# Patient Record
Sex: MALE | Race: WHITE | Employment: UNEMPLOYED | URBAN - METROPOLITAN AREA
[De-identification: names, ages, dates, MRNs, and addresses within clinical notes are randomized per-mention and may not be internally consistent; named-entity substitution may affect disease eponyms.]

---

## 2021-04-17 ENCOUNTER — HOSPITAL ENCOUNTER (EMERGENCY)
Age: 36
Discharge: HOME | End: 2021-04-17
Attending: EMERGENCY MEDICINE

## 2021-04-17 VITALS
HEIGHT: 67 IN | TEMPERATURE: 98.5 F | BODY MASS INDEX: 25.9 KG/M2 | WEIGHT: 165 LBS | RESPIRATION RATE: 14 BRPM | OXYGEN SATURATION: 97 % | SYSTOLIC BLOOD PRESSURE: 111 MMHG | HEART RATE: 86 BPM | DIASTOLIC BLOOD PRESSURE: 87 MMHG

## 2021-04-17 VITALS
HEIGHT: 67 IN | OXYGEN SATURATION: 98 % | DIASTOLIC BLOOD PRESSURE: 88 MMHG | SYSTOLIC BLOOD PRESSURE: 128 MMHG | WEIGHT: 165 LBS | RESPIRATION RATE: 14 BRPM | BODY MASS INDEX: 25.9 KG/M2 | TEMPERATURE: 98.4 F | HEART RATE: 87 BPM

## 2021-04-17 DIAGNOSIS — R73.9 HYPERGLYCEMIA: Primary | ICD-10-CM

## 2021-04-17 DIAGNOSIS — T14.8XXA FOREIGN BODY IN SKIN: ICD-10-CM

## 2021-04-17 LAB
ANION GAP SERPL CALCULATED.3IONS-SCNC: 8 MMOL/L (ref 7–16)
BASOPHILS ABSOLUTE: 0.02 E9/L (ref 0–0.2)
BASOPHILS RELATIVE PERCENT: 0.3 % (ref 0–2)
BUN BLDV-MCNC: 15 MG/DL (ref 6–20)
CALCIUM SERPL-MCNC: 8.9 MG/DL (ref 8.6–10.2)
CHLORIDE BLD-SCNC: 96 MMOL/L (ref 98–107)
CHP ED QC CHECK: YES
CO2: 27 MMOL/L (ref 22–29)
CREAT SERPL-MCNC: 0.8 MG/DL (ref 0.7–1.2)
EOSINOPHILS ABSOLUTE: 0.08 E9/L (ref 0.05–0.5)
EOSINOPHILS RELATIVE PERCENT: 1.1 % (ref 0–6)
GFR AFRICAN AMERICAN: >60
GFR NON-AFRICAN AMERICAN: >60 ML/MIN/1.73
GLUCOSE BLD-MCNC: 289 MG/DL
GLUCOSE BLD-MCNC: 509 MG/DL (ref 74–99)
HCT VFR BLD CALC: 38.4 % (ref 37–54)
HEMOGLOBIN: 12.6 G/DL (ref 12.5–16.5)
IMMATURE GRANULOCYTES #: 0.01 E9/L
IMMATURE GRANULOCYTES %: 0.1 % (ref 0–5)
LYMPHOCYTES ABSOLUTE: 1.74 E9/L (ref 1.5–4)
LYMPHOCYTES RELATIVE PERCENT: 24.1 % (ref 20–42)
MCH RBC QN AUTO: 30.5 PG (ref 26–35)
MCHC RBC AUTO-ENTMCNC: 32.8 % (ref 32–34.5)
MCV RBC AUTO: 93 FL (ref 80–99.9)
METER GLUCOSE: 289 MG/DL (ref 74–99)
METER GLUCOSE: 453 MG/DL (ref 74–99)
METER GLUCOSE: >500 MG/DL (ref 74–99)
MONOCYTES ABSOLUTE: 0.54 E9/L (ref 0.1–0.95)
MONOCYTES RELATIVE PERCENT: 7.5 % (ref 2–12)
NEUTROPHILS ABSOLUTE: 4.84 E9/L (ref 1.8–7.3)
NEUTROPHILS RELATIVE PERCENT: 66.9 % (ref 43–80)
PDW BLD-RTO: 12.3 FL (ref 11.5–15)
PLATELET # BLD: 200 E9/L (ref 130–450)
PMV BLD AUTO: 9.9 FL (ref 7–12)
POTASSIUM SERPL-SCNC: 4.6 MMOL/L (ref 3.5–5)
RBC # BLD: 4.13 E12/L (ref 3.8–5.8)
SODIUM BLD-SCNC: 131 MMOL/L (ref 132–146)
WBC # BLD: 7.2 E9/L (ref 4.5–11.5)

## 2021-04-17 PROCEDURE — 82962 GLUCOSE BLOOD TEST: CPT

## 2021-04-17 PROCEDURE — 6370000000 HC RX 637 (ALT 250 FOR IP): Performed by: STUDENT IN AN ORGANIZED HEALTH CARE EDUCATION/TRAINING PROGRAM

## 2021-04-17 PROCEDURE — 85025 COMPLETE CBC W/AUTO DIFF WBC: CPT

## 2021-04-17 PROCEDURE — 99284 EMERGENCY DEPT VISIT MOD MDM: CPT

## 2021-04-17 PROCEDURE — 99283 EMERGENCY DEPT VISIT LOW MDM: CPT

## 2021-04-17 PROCEDURE — 80048 BASIC METABOLIC PNL TOTAL CA: CPT

## 2021-04-17 RX ORDER — 0.9 % SODIUM CHLORIDE 0.9 %
1000 INTRAVENOUS SOLUTION INTRAVENOUS ONCE
Status: DISCONTINUED | OUTPATIENT
Start: 2021-04-17 | End: 2021-04-17 | Stop reason: HOSPADM

## 2021-04-17 RX ORDER — ROCURONIUM BROMIDE 10 MG/ML
INJECTION, SOLUTION INTRAVENOUS
Status: DISCONTINUED
Start: 2021-04-17 | End: 2021-04-17

## 2021-04-17 RX ORDER — ETOMIDATE 2 MG/ML
INJECTION INTRAVENOUS
Status: DISCONTINUED
Start: 2021-04-17 | End: 2021-04-17

## 2021-04-17 RX ORDER — SODIUM CHLORIDE 0.9 % (FLUSH) 0.9 %
10 SYRINGE (ML) INJECTION PRN
Status: DISCONTINUED | OUTPATIENT
Start: 2021-04-17 | End: 2021-04-17 | Stop reason: HOSPADM

## 2021-04-17 RX ADMIN — INSULIN LISPRO 5 UNITS: 100 INJECTION, SOLUTION INTRAVENOUS; SUBCUTANEOUS at 11:23

## 2021-04-17 NOTE — ED NOTES
Pt taken out by EARL MORILLO [facility] to squad car to go to [facility].       Juan Arango RN  04/17/21 0830

## 2021-04-17 NOTE — ED NOTES
Bed: 22  Expected date:   Expected time:   Means of arrival:   Comments:  champ Spivey RN  04/17/21 4787

## 2021-04-17 NOTE — ED NOTES
Reviewed discharge instructions with patient, patient verbalized understanding of follow up care and denies any further questions, no acute signs/symptoms of distress upon discharge, pt ambulatory upon d/c in police custody       Rachelle Xie RN  04/17/21 7188

## 2021-04-17 NOTE — ED PROVIDER NOTES
past surgical history, social history, and family history    ---------------------------------------------------PHYSICAL EXAM--------------------------------------    Constitutional/General: Alert and oriented x3. Anxious and agitated but in no acute distress. Head: Normocephalic and atraumatic  Eyes:  EOMI, sclera non icteric  Mouth: Oropharynx clear, handling secretions, no trismus, no asymmetry of the posterior oropharynx or uvular edema  Neck: Supple, full ROM, no stridor, no meningeal signs  Respiratory: Lungs clear to auscultation bilaterally, no wheezes, rales, or rhonchi. Not in respiratory distress  Cardiovascular:  Regular rate. Regular rhythm. No murmurs, no aortic murmurs, no gallops, or rubs. Chest: No chest wall tenderness  Gastrointestinal:  Abdomen Soft, Non tender, Non distended. No rebound, guarding, or rigidity. No pulsatile masses. Musculoskeletal: Moves all extremities x 4. Warm and well perfused, no clubbing, no cyanosis, no edema. Capillary refill <3 seconds  Skin: skin warm and dry. No rashes. Neurologic: GCS 15, no focal deficits, symmetric strength 5/5 in the upper and lower extremities bilaterally  Psychiatric: Normal Affect    -------------------------------------------------- RESULTS -------------------------------------------------  I have personally reviewed all laboratory and imaging results for this patient. Results are listed below. LABS: (Lab results interpreted by me)  No results found for this visit on 04/17/21.,       RADIOLOGY:  Interpreted by Radiologist unless otherwise specified  No orders to display         ------------------------- NURSING NOTES AND VITALS REVIEWED ---------------------------   The nursing notes within the ED encounter and vital signs as below have been reviewed by myself  Pulse 86   Resp 14   Ht 5' 7\" (1.702 m)   Wt 165 lb (74.8 kg)   SpO2 97%   BMI 25.84 kg/m²     Oxygen Saturation Interpretation: 97 % on room air.   Normal    The patients available past medical records and past encounters were reviewed. ------------------------------ ED COURSE/MEDICAL DECISION MAKING----------------------  Medications - No data to display          Medical Decision Making:     The patient was seen and evaluated by the Attending Emergency Medicine Physician Dr. Priyanka Dubon. The patient is a 69-year-old male presents to the emergency department for hyperglycemia. The patient's blood sugar was evaluated in the ED and was found to be 289. Patient was not having any other symptoms at this time. Recommend him to continue taking his diabetic medications at home. He is to follow-up with his family doctor. He was discharged back to [facility] with police. This patient's ED course included: a personal history and physicial examination and re-evaluation prior to disposition    This patient has remained hemodynamically stable during their ED course. Counseling: The emergency provider has spoken with the patient and discussed todays results, in addition to providing specific details for the plan of care and counseling regarding the diagnosis and prognosis. Questions are answered at this time and they are agreeable with the plan.       --------------------------------- IMPRESSION AND DISPOSITION ---------------------------------    IMPRESSION  1. Hyperglycemia        DISPOSITION  Disposition: Discharge to [facility]  Patient condition is stable        NOTE: This report was transcribed using voice recognition software.  Every effort was made to ensure accuracy; however, inadvertent computerized transcription errors may be present        Rosas Navarro DO  Resident  04/17/21 530

## 2021-04-17 NOTE — ED PROVIDER NOTES
Department of Emergency Medicine   ED  Provider Note  Admit Date/RoomTime: 4/17/2021 10:10 AM  ED Room: XU/XU          History of Present Illness:  4/17/21, Time: 10:45 AM EDT  Chief Complaint   Patient presents with    Hyperglycemia     pt was seen earlier for high blood sugar. went to [facility] for booking and was refused. no symptoms at this time. Rachel Valdes is a 38 y.o. male presenting to the ED for hyperglycemia, beginning today. The complaint has been persistent, moderate in severity, and worsened by nothing. Patient is a 38-year-old male type II diabetic who presents to the emergency department escorted by police for hyperglycemia. The patient was actually just seen here earlier today for high blood sugar and had a blood sugar of 289. Apparently he ate and then went to the [place] longterm for booking and they state that his blood sugar was too high for him to be booked into longterm at this time. The patient does not have any symptoms at this time. He has been out of his medications for a while. He denies any chest pain, shortness breath, lightheadedness, dizziness, syncope, nausea, vomiting, diarrhea, abdominal pain, or other acute symptoms or concerns. Review of Systems:   Pertinent positives and negatives are stated within HPI, all other systems reviewed and are negative.        --------------------------------------------- PAST HISTORY ---------------------------------------------  Past Medical History:  has no past medical history on file. Past Surgical History:  has no past surgical history on file. Social History:  reports that he has quit smoking. He has never used smokeless tobacco. He reports current drug use. Drug: Opiates . Family History: family history is not on file. . Unless otherwise noted, family history is non contributory    The patients home medications have been reviewed. Allergies:  Other and Shellfish-derived products    I have reviewed the past medical 24.1 20.0 - 42.0 %    Monocytes % 7.5 2.0 - 12.0 %    Eosinophils % 1.1 0.0 - 6.0 %    Basophils % 0.3 0.0 - 2.0 %    Neutrophils Absolute 4.84 1.80 - 7.30 E9/L    Immature Granulocytes # 0.01 E9/L    Lymphocytes Absolute 1.74 1.50 - 4.00 E9/L    Monocytes Absolute 0.54 0.10 - 0.95 E9/L    Eosinophils Absolute 0.08 0.05 - 0.50 E9/L    Basophils Absolute 0.02 0.00 - 0.20 H1/C   Basic Metabolic Panel   Result Value Ref Range    Sodium 131 (L) 132 - 146 mmol/L    Potassium 4.6 3.5 - 5.0 mmol/L    Chloride 96 (L) 98 - 107 mmol/L    CO2 27 22 - 29 mmol/L    Anion Gap 8 7 - 16 mmol/L    Glucose 509 (HH) 74 - 99 mg/dL    BUN 15 6 - 20 mg/dL    CREATININE 0.8 0.7 - 1.2 mg/dL    GFR Non-African American >60 >=60 mL/min/1.73    GFR African American >60     Calcium 8.9 8.6 - 10.2 mg/dL   POCT Glucose   Result Value Ref Range    Meter Glucose >500 (H) 74 - 99 mg/dL   POCT Glucose   Result Value Ref Range    Meter Glucose 453 (H) 74 - 99 mg/dL   ,       RADIOLOGY:  Interpreted by Radiologist unless otherwise specified  No orders to display       ------------------------- NURSING NOTES AND VITALS REVIEWED ---------------------------   The nursing notes within the ED encounter and vital signs as below have been reviewed by myself  /88   Pulse 87   Temp 98.4 °F (36.9 °C) (Temporal)   Resp 14   Ht 5' 7\" (1.702 m)   Wt 165 lb (74.8 kg)   SpO2 98%   BMI 25.84 kg/m²     Oxygen Saturation Interpretation: 98 % on room air. Normal    The patients available past medical records and past encounters were reviewed. ------------------------------ ED COURSE/MEDICAL DECISION MAKING----------------------  Medications   insulin lispro (HUMALOG) injection vial 5 Units (5 Units Subcutaneous Given 4/17/21 1123)           The cardiac monitor revealed NSR with a heart rate in the 80s as interpreted by me. The cardiac monitor was ordered secondary to the patient's hyperglycemia and to monitor the patient for dysrhythmia.    CPT [facility] [id number] Making:     The patient was seen and evaluated by the Attending Emergency Medicine Physician Dr. Wang Sepulveda. The patient is a 70-year-old male presents to the emergency department complaning of hyperglycemia. The patient is hemodynamically stable, nontoxic in appearance, no acute distress. He does not have any acute complaints at this time. Patient was just seen here and blood sugar was 289. He did eat after he left and his blood sugar was up in the 400s, his point-of-care blood glucose here was over 500. Patient was refusing an IV or labs to be drawn. However, he did agree to a straight draw for a BMP. He states that he would drink water and did not want an IV or fluids. The patient became agitated and tried to run and leave the emergency department. There were 2 police officers in his room at times. The patient was tased. His repeat blood sugar was less than 500. The patient was discharged back to nursing home. PROCEDURE    FOREIGN BODY REMOVAL  Risks, benefits and alternatives (for applicable procedures below) described. Performed by:   Under the direct supervision of (if necessary): Indication:  retained foreign body. Location:   Chest wall . Informed consent obtained: by patient or legal gardian. Prep: The skin was none  required and draped in a sterile fashion. Anesthetic: Not required   Foreign Body was: removed using tweezers and had the appearance of metal.  Ultrasound Guidance:   not used. Complications:  None. Patient tolerated the procedure well. Re-Evaluations:       Patient is feeling better.       This patient's ED course included: a personal history and physicial examination, re-evaluation prior to disposition, multiple bedside re-evaluations, IV medications, cardiac monitoring, continuous pulse oximetry and complex medical decision making and emergency management    This patient has remained hemodynamically stable during their ED course. Counseling: The emergency provider has spoken with the patient and discussed todays results, in addition to providing specific details for the plan of care and counseling regarding the diagnosis and prognosis. Questions are answered at this time and they are agreeable with the plan.       --------------------------------- IMPRESSION AND DISPOSITION ---------------------------------    IMPRESSION  1. Hyperglycemia    2. Foreign body in skin        DISPOSITION  Disposition: Discharge to custodial  Patient condition is stable        NOTE: This report was transcribed using voice recognition software.  Every effort was made to ensure accuracy; however, inadvertent computerized transcription errors may be present        Alberta Nuñez DO  Resident  04/17/21 3656

## 2023-01-19 ENCOUNTER — HOSPITAL ENCOUNTER (EMERGENCY)
Age: 38
Discharge: PSYCHIATRIC OR SUBSTANCE USE FACILITY | End: 2023-01-21
Attending: EMERGENCY MEDICINE
Payer: COMMERCIAL

## 2023-01-19 DIAGNOSIS — S00.531A CONTUSION OF LIP, INITIAL ENCOUNTER: ICD-10-CM

## 2023-01-19 DIAGNOSIS — T14.91XA SUICIDE ATTEMPT (HCC): ICD-10-CM

## 2023-01-19 DIAGNOSIS — F39 MOOD DISORDER (HCC): Primary | ICD-10-CM

## 2023-01-19 LAB
ACETAMINOPHEN LEVEL: <5 MCG/ML (ref 10–30)
ALBUMIN SERPL-MCNC: 4.6 G/DL (ref 3.5–5.2)
ALP BLD-CCNC: 129 U/L (ref 40–129)
ALT SERPL-CCNC: 40 U/L (ref 0–40)
AMPHETAMINE SCREEN, URINE: NOT DETECTED
ANION GAP SERPL CALCULATED.3IONS-SCNC: 15 MMOL/L (ref 7–16)
AST SERPL-CCNC: 24 U/L (ref 0–39)
BARBITURATE SCREEN URINE: NOT DETECTED
BASOPHILS ABSOLUTE: 0.02 E9/L (ref 0–0.2)
BASOPHILS RELATIVE PERCENT: 0.2 % (ref 0–2)
BENZODIAZEPINE SCREEN, URINE: NOT DETECTED
BILIRUB SERPL-MCNC: <0.2 MG/DL (ref 0–1.2)
BUN BLDV-MCNC: 17 MG/DL (ref 6–20)
CALCIUM SERPL-MCNC: 10.2 MG/DL (ref 8.6–10.2)
CANNABINOID SCREEN URINE: NOT DETECTED
CHLORIDE BLD-SCNC: 100 MMOL/L (ref 98–107)
CHP ED QC CHECK: NORMAL
CHP ED QC CHECK: NORMAL
CO2: 22 MMOL/L (ref 22–29)
COCAINE METABOLITE SCREEN URINE: NOT DETECTED
CREAT SERPL-MCNC: 0.8 MG/DL (ref 0.7–1.2)
EKG ATRIAL RATE: 96 BPM
EKG P AXIS: 41 DEGREES
EKG P-R INTERVAL: 158 MS
EKG Q-T INTERVAL: 338 MS
EKG QRS DURATION: 92 MS
EKG QTC CALCULATION (BAZETT): 427 MS
EKG R AXIS: 39 DEGREES
EKG T AXIS: 41 DEGREES
EKG VENTRICULAR RATE: 96 BPM
EOSINOPHILS ABSOLUTE: 0.01 E9/L (ref 0.05–0.5)
EOSINOPHILS RELATIVE PERCENT: 0.1 % (ref 0–6)
ETHANOL: <10 MG/DL (ref 0–0.08)
FENTANYL SCREEN, URINE: NOT DETECTED
GFR SERPL CREATININE-BSD FRML MDRD: >60 ML/MIN/1.73
GLUCOSE BLD-MCNC: 113 MG/DL
GLUCOSE BLD-MCNC: 137 MG/DL (ref 74–99)
GLUCOSE BLD-MCNC: 195 MG/DL
GLUCOSE BLD-MCNC: 195 MG/DL
HCT VFR BLD CALC: 46.7 % (ref 37–54)
HEMOGLOBIN: 15 G/DL (ref 12.5–16.5)
IMMATURE GRANULOCYTES #: 0.06 E9/L
IMMATURE GRANULOCYTES %: 0.6 % (ref 0–5)
INFLUENZA A: NOT DETECTED
INFLUENZA B: NOT DETECTED
LACTIC ACID: 3.5 MMOL/L (ref 0.5–2.2)
LYMPHOCYTES ABSOLUTE: 2.18 E9/L (ref 1.5–4)
LYMPHOCYTES RELATIVE PERCENT: 20.8 % (ref 20–42)
Lab: NORMAL
MCH RBC QN AUTO: 28.8 PG (ref 26–35)
MCHC RBC AUTO-ENTMCNC: 32.1 % (ref 32–34.5)
MCV RBC AUTO: 89.6 FL (ref 80–99.9)
METER GLUCOSE: 113 MG/DL (ref 74–99)
METER GLUCOSE: 139 MG/DL (ref 74–99)
METER GLUCOSE: 195 MG/DL (ref 74–99)
METHADONE SCREEN, URINE: NOT DETECTED
MONOCYTES ABSOLUTE: 0.45 E9/L (ref 0.1–0.95)
MONOCYTES RELATIVE PERCENT: 4.3 % (ref 2–12)
NEUTROPHILS ABSOLUTE: 7.77 E9/L (ref 1.8–7.3)
NEUTROPHILS RELATIVE PERCENT: 74 % (ref 43–80)
OPIATE SCREEN URINE: NOT DETECTED
OXYCODONE URINE: NOT DETECTED
PDW BLD-RTO: 13.8 FL (ref 11.5–15)
PHENCYCLIDINE SCREEN URINE: NOT DETECTED
PLATELET # BLD: 248 E9/L (ref 130–450)
PMV BLD AUTO: 10 FL (ref 7–12)
POTASSIUM SERPL-SCNC: 4.3 MMOL/L (ref 3.5–5)
RBC # BLD: 5.21 E12/L (ref 3.8–5.8)
SALICYLATE, SERUM: <0.3 MG/DL (ref 0–30)
SARS-COV-2 RNA, RT PCR: NOT DETECTED
SODIUM BLD-SCNC: 137 MMOL/L (ref 132–146)
TOTAL PROTEIN: 8.4 G/DL (ref 6.4–8.3)
TRICYCLIC ANTIDEPRESSANTS SCREEN SERUM: NEGATIVE NG/ML
WBC # BLD: 10.5 E9/L (ref 4.5–11.5)

## 2023-01-19 PROCEDURE — 83605 ASSAY OF LACTIC ACID: CPT

## 2023-01-19 PROCEDURE — 82077 ASSAY SPEC XCP UR&BREATH IA: CPT

## 2023-01-19 PROCEDURE — 80179 DRUG ASSAY SALICYLATE: CPT

## 2023-01-19 PROCEDURE — 80053 COMPREHEN METABOLIC PANEL: CPT

## 2023-01-19 PROCEDURE — 87636 SARSCOV2 & INF A&B AMP PRB: CPT

## 2023-01-19 PROCEDURE — 85025 COMPLETE CBC W/AUTO DIFF WBC: CPT

## 2023-01-19 PROCEDURE — 80143 DRUG ASSAY ACETAMINOPHEN: CPT

## 2023-01-19 PROCEDURE — 93010 ELECTROCARDIOGRAM REPORT: CPT | Performed by: INTERNAL MEDICINE

## 2023-01-19 PROCEDURE — 80307 DRUG TEST PRSMV CHEM ANLYZR: CPT

## 2023-01-19 PROCEDURE — 99285 EMERGENCY DEPT VISIT HI MDM: CPT

## 2023-01-19 PROCEDURE — 82962 GLUCOSE BLOOD TEST: CPT

## 2023-01-19 PROCEDURE — 6360000002 HC RX W HCPCS

## 2023-01-19 PROCEDURE — 93005 ELECTROCARDIOGRAM TRACING: CPT | Performed by: EMERGENCY MEDICINE

## 2023-01-19 PROCEDURE — 6360000002 HC RX W HCPCS: Performed by: EMERGENCY MEDICINE

## 2023-01-19 PROCEDURE — 96372 THER/PROPH/DIAG INJ SC/IM: CPT

## 2023-01-19 RX ORDER — AMITRIPTYLINE HYDROCHLORIDE 25 MG/1
25 TABLET, FILM COATED ORAL NIGHTLY
COMMUNITY

## 2023-01-19 RX ORDER — ATORVASTATIN CALCIUM 20 MG/1
20 TABLET, FILM COATED ORAL NIGHTLY
COMMUNITY

## 2023-01-19 RX ORDER — 0.9 % SODIUM CHLORIDE 0.9 %
1000 INTRAVENOUS SOLUTION INTRAVENOUS ONCE
Status: DISCONTINUED | OUTPATIENT
Start: 2023-01-19 | End: 2023-01-19

## 2023-01-19 RX ORDER — LORAZEPAM 2 MG/ML
2 INJECTION INTRAMUSCULAR ONCE
Status: COMPLETED | OUTPATIENT
Start: 2023-01-19 | End: 2023-01-19

## 2023-01-19 RX ORDER — HALOPERIDOL 5 MG/ML
INJECTION INTRAMUSCULAR
Status: COMPLETED
Start: 2023-01-19 | End: 2023-01-19

## 2023-01-19 RX ORDER — 0.9 % SODIUM CHLORIDE 0.9 %
1000 INTRAVENOUS SOLUTION INTRAVENOUS ONCE
Status: DISCONTINUED | OUTPATIENT
Start: 2023-01-19 | End: 2023-01-21 | Stop reason: HOSPADM

## 2023-01-19 RX ORDER — LORAZEPAM 2 MG/ML
2 INJECTION INTRAMUSCULAR ONCE
Status: DISCONTINUED | OUTPATIENT
Start: 2023-01-19 | End: 2023-01-19

## 2023-01-19 RX ORDER — HALOPERIDOL 5 MG/ML
10 INJECTION INTRAMUSCULAR ONCE
Status: COMPLETED | OUTPATIENT
Start: 2023-01-19 | End: 2023-01-19

## 2023-01-19 RX ORDER — LORAZEPAM 2 MG/ML
INJECTION INTRAMUSCULAR
Status: COMPLETED
Start: 2023-01-19 | End: 2023-01-19

## 2023-01-19 RX ORDER — GLIPIZIDE 10 MG/1
10 TABLET ORAL
COMMUNITY

## 2023-01-19 RX ORDER — AMLODIPINE BESYLATE 5 MG/1
5 TABLET ORAL DAILY
COMMUNITY

## 2023-01-19 RX ADMIN — LORAZEPAM 2 MG: 2 INJECTION INTRAMUSCULAR; INTRAVENOUS at 09:52

## 2023-01-19 RX ADMIN — HALOPERIDOL LACTATE 10 MG: 5 INJECTION INTRAMUSCULAR at 07:38

## 2023-01-19 RX ADMIN — HALOPERIDOL 10 MG: 5 INJECTION INTRAMUSCULAR at 07:38

## 2023-01-19 RX ADMIN — LORAZEPAM 2 MG: 2 INJECTION INTRAMUSCULAR; INTRAVENOUS at 07:38

## 2023-01-19 RX ADMIN — LORAZEPAM 2 MG: 2 INJECTION INTRAMUSCULAR at 07:38

## 2023-01-19 ASSESSMENT — PAIN DESCRIPTION - ONSET: ONSET: ON-GOING

## 2023-01-19 ASSESSMENT — LIFESTYLE VARIABLES: HOW OFTEN DO YOU HAVE A DRINK CONTAINING ALCOHOL: NEVER

## 2023-01-19 ASSESSMENT — PAIN DESCRIPTION - FREQUENCY: FREQUENCY: CONTINUOUS

## 2023-01-19 ASSESSMENT — PAIN DESCRIPTION - LOCATION: LOCATION: FOOT

## 2023-01-19 ASSESSMENT — PAIN DESCRIPTION - ORIENTATION: ORIENTATION: RIGHT;LEFT

## 2023-01-19 ASSESSMENT — PAIN DESCRIPTION - PAIN TYPE: TYPE: ACUTE PAIN

## 2023-01-19 ASSESSMENT — PAIN - FUNCTIONAL ASSESSMENT: PAIN_FUNCTIONAL_ASSESSMENT: 0-10

## 2023-01-19 ASSESSMENT — PAIN SCALES - GENERAL: PAINLEVEL_OUTOF10: 4

## 2023-01-19 NOTE — ED NOTES
Patient refusing to have blood drawn at this time. Dr. Lion Gaines has been made aware. Patient is medically clear at this time per Dr. Lion Gaines. Social Work consulted now by Dr. Lion Gaines.       Bigg Dumont RN  01/19/23 5887
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
## 2023-01-19 NOTE — ED NOTES
Patient refusing Repeat Lactic acid. Dr Nafisa Hooker states that the patient has already been medically cleared and the repeat Lactic acid is not neccessary. Patient will not be going to [facility] per [facility].  Patient will be admitted to behavior health in [address]  01/19/23 5162

## 2023-01-19 NOTE — ED NOTES
Patient refusing IV at this time,unable to start IV fluids at this time. Dr. John Mackenzie is aware. Per Dr. John Mackenzie, repeat lactic acid in 4-6 hours. No further orders at this time.       Paddy French RN  01/19/23 6833

## 2023-01-19 NOTE — ED NOTES
Pt states between 0478-5112 he took Humulin R 30 units, Insulin 70/30, 36 units, Metformin 1000mg, 11-14 pills and Glipizide unsure of how many he took     Madelyn GAYATRI Parra  01/19/23 6194 Дмитрий Raya, RN  01/19/23 0540
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
## 2023-01-19 NOTE — ED NOTES
Spoke with Beth Reis at [facility]. Due to all medications taken pt is considered medically cleared after 8 hours from ingestion.  Pt states he took meds between 7-8pm. Blood sugar should be rechecked one hour from time of initial check in Tutu Jerry RN  01/19/23 4832

## 2023-01-19 NOTE — ED NOTES
3 belongings bag with patient's labels placed in [facility] AN AFFILIATE OF [place] [address].        Janna Morris RN  01/19/23 0911

## 2023-01-19 NOTE — ED NOTES
Patient refusing to have blood sugar checked and blood work done at this time. Dr. Cyndie Latif is aware.       Nico Cevallos, RN  01/19/23 1600 Eulalia Chaudhari, RN  01/19/23 5106

## 2023-01-19 NOTE — ED NOTES
Pt resting comfortably at this time. Violent restraints removed and discontinued now.       Jamaal Alas RN  01/19/23 4877

## 2023-01-19 NOTE — ED NOTES
This nurse talked with Poison control who recommended that we draw a lactic acid on the patient. Dr. Josiah Piña has been made aware.       Bigg Dumont, GAYATRI  01/19/23 2659
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
## 2023-01-19 NOTE — ED NOTES
Call to [place] [phone] and spoke to [facility]- he stated that he will not be held on the warrant charge and was only given a court date for the domestic violence charge. He stated that he is supposed to have court tomorrow. The josep stated that if we d/c him he will be released to the streets. Spoke to the ED DR. Cheryl Manning who stated that since the pt will not be going to [facility] he will not overturn the pink slip because he needs evaluated first by a psychiatrist.     Melyssa Minor, [facility]  01/19/23 8759

## 2023-01-20 LAB
METER GLUCOSE: 223 MG/DL (ref 74–99)
METER GLUCOSE: 95 MG/DL (ref 74–99)

## 2023-01-20 PROCEDURE — 82962 GLUCOSE BLOOD TEST: CPT

## 2023-01-20 PROCEDURE — 6370000000 HC RX 637 (ALT 250 FOR IP): Performed by: EMERGENCY MEDICINE

## 2023-01-20 RX ORDER — NICOTINE 21 MG/24HR
1 PATCH, TRANSDERMAL 24 HOURS TRANSDERMAL ONCE
Status: DISCONTINUED | OUTPATIENT
Start: 2023-01-20 | End: 2023-01-21 | Stop reason: HOSPADM

## 2023-01-20 ASSESSMENT — LIFESTYLE VARIABLES
HOW MANY STANDARD DRINKS CONTAINING ALCOHOL DO YOU HAVE ON A TYPICAL DAY: PATIENT DOES NOT DRINK
HOW OFTEN DO YOU HAVE A DRINK CONTAINING ALCOHOL: NEVER

## 2023-01-20 NOTE — ED NOTES
Per supervisor Rosendo Mcburney SW will call [facility] and inquire about earliest time pt can be admitted and arrange ambulance trip for that time tomorrow. Call to [facility] [phone], spoke with [facility], reports their facility opens at 07:00. They can admit pt this time or after. Call to [facility] [phone], spoke with Dallas Paz, trip is scheduled for 1/21/2023 08:00AM.  This is earliest [facility] can transport pt.  Call to [facility] informed [facility] of arrangements for pt to be there at approximately 09:30AM.      JOHNY Florence, Pendleton Hash  01/20/23 7441

## 2023-01-20 NOTE — ED NOTES
Pt has been accepted to [facility] by Dr. Inés Blevins - REQUESTING THAT PT BE MEDICATED BEFORE LEAVING THIS ER    PAS by [address] TO TRANSPORT UNTIL LATER THIS AFTERNOON     [address] [facility] , [facility]  01/20/23 [facility], [facility]  01/20/23 2110

## 2023-01-20 NOTE — ED NOTES
Call to [facility] re: arrival time spoke with My Kasper, reports new ETA: 16:45. ANDREE nurse Mercy Saran aware.       [facility], [facility], [facility]  01/20/23 [address] [facility], [place]  01/20/23 1534

## 2023-01-20 NOTE — ED NOTES
Patient sitting up in bed, patient states he is not having thoughts of hurting himself but he is having thoughts of hurting others, when this RN asked who he is having thoughts of harming he started \"that's my business\"     Yaz Archer, GAYATRI  01/20/23 1477
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
## 2023-01-20 NOTE — ED NOTES
Received report form Arminda Sullivan RN. Patient has a constant observer at bedside.       Abdi Collins RN  01/19/23 1941

## 2023-01-20 NOTE — ED NOTES
Call to [facility] who reports they are adjusting the ETA. Now to be expected 19:16. ANDREE nurse Ketty Dhillon aware. [facility], [facility]  01/20/23 1715    Call from [facility] at [address] [facility] reporting [facility] is asking where pt is, also reporting if pt can not arrive at their facility by 19:00 they will not be able to admit. SW will call [facility]. Per Kayleigh Carrillo at [facility] they are not able to do the requested trip. SW will call [employer] [phone]. Per Naseem Wilkins they cannot accommodate the trip tonight. Call to [facility] - indicated [facility] has moved back arrival time several times now and [facility] and [facility] cannot accommodate the trip.          [facility], [facility]  01/20/23 9330
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
## 2023-01-20 NOTE — ED NOTES
Report given to [facility], RN.        Cherelle Cai RN  01/20/23 9534

## 2023-01-20 NOTE — ED NOTES
Patient laying in bed, girlfriend at bedside. Appears to be calm and cooperative.  This RN updated patient on ETA of PAS to [facility]  01/20/23 3444

## 2023-01-20 NOTE — ED NOTES
Lisa Coffey from [facility] called to obtain further information on this pt    RN gave an update         Anny Carlin [place]  01/20/23 8485

## 2023-01-20 NOTE — ED NOTES
Patient resting with eyes closed, respirations non-labored, skin warm/dry, no distress noted. Patient responds to voice. Patient uncooperative for assessment, patient stated \"Leave me alone. I don't want to be bothered\". Unable to complete CSSRS Frequent Screener. Patient did allow this RN to check blood sugar. POCT glucose 113. Constant observer remains at bedside for safety.       Cherelle Cai RN  01/19/23 8065

## 2023-01-20 NOTE — ED NOTES
ACCESS CENTER UPDATE:    Declined:  [facility]     Pending:  JOHNY Rivas, [facility]  01/20/23 9821

## 2023-01-20 NOTE — ED NOTES
Chai Rojas requested this RN present patient to Dr. Ryan Zapata for admission to [address] [place] [address].       Kristi Quintanilla RN  01/19/23 2001

## 2023-01-21 VITALS
WEIGHT: 204 LBS | DIASTOLIC BLOOD PRESSURE: 84 MMHG | HEIGHT: 68 IN | SYSTOLIC BLOOD PRESSURE: 138 MMHG | HEART RATE: 108 BPM | RESPIRATION RATE: 16 BRPM | OXYGEN SATURATION: 97 % | TEMPERATURE: 98.1 F | BODY MASS INDEX: 30.92 KG/M2

## 2023-01-21 LAB
CHP ED QC CHECK: NORMAL
GLUCOSE BLD-MCNC: 241 MG/DL
METER GLUCOSE: 241 MG/DL (ref 74–99)
METER GLUCOSE: 307 MG/DL (ref 74–99)

## 2023-01-21 PROCEDURE — 82962 GLUCOSE BLOOD TEST: CPT

## 2023-01-21 PROCEDURE — 6370000000 HC RX 637 (ALT 250 FOR IP): Performed by: STUDENT IN AN ORGANIZED HEALTH CARE EDUCATION/TRAINING PROGRAM

## 2023-01-21 RX ADMIN — INSULIN HUMAN 10 UNITS: 100 INJECTION, SOLUTION PARENTERAL at 12:03

## 2023-01-21 RX ADMIN — INSULIN HUMAN 6 UNITS: 100 INJECTION, SOLUTION PARENTERAL at 08:48

## 2023-01-21 NOTE — ED NOTES
Nurse to nurse report given to [facility] at [facility].      Antonio Del Toro, RN  01/21/23 3242

## 2023-01-21 NOTE — ED NOTES
[facility] JENAE called [facility] to get an estimated time for  since it was past the estimated time given to previous SW of [facility] with [facility] states they are running behind and it is going to be approximately Noon now. Neil Mcrae states they are trying to outsource the call to see if it can be sooner, but will call if anything changes. New ETA 12:00pm.     ANDREE EMANUEL called [facility] and spoke to [place], who reports they do not have transport vehicle at this time. Sienna Moeller was attempted 2x's, no answer.      [facility], [facility]  01/21/23 5008      SW called [facility] at [phone] and 21 [phone], no answer     [facility], [facility]  01/21/23 [address], [facility]  01/21/23 4381

## 2023-01-21 NOTE — ED NOTES
ANDREE EMANUEL called [facility] and spoke to Valerie to see if there is a new ETA. [place] states 90 mins and states a squad has been assigned.       Yonug Henderson [place]  01/21/23 9165

## 2023-01-21 NOTE — ED NOTES
ANDREE EMANUEL called [facility] and spoke to Grant Pichardo to see if there is a new ETA. Grant Pichardo states \"within the hour. \" JENAE asked if it will be before 2:00 and she states \"within an hour, so by 2:50pm\"      GERI Brewster  01/21/23 8821

## 2023-04-05 ENCOUNTER — HOSPITAL ENCOUNTER (EMERGENCY)
Age: 38
Discharge: LEFT BEFORE DISCHARGE | End: 2023-04-05
Attending: EMERGENCY MEDICINE

## 2023-04-05 VITALS
RESPIRATION RATE: 16 BRPM | HEART RATE: 100 BPM | DIASTOLIC BLOOD PRESSURE: 97 MMHG | SYSTOLIC BLOOD PRESSURE: 155 MMHG | OXYGEN SATURATION: 98 % | TEMPERATURE: 98.2 F

## 2023-04-05 DIAGNOSIS — R46.89 AGGRESSIVE BEHAVIOR OF ADULT: Primary | ICD-10-CM

## 2023-04-05 ASSESSMENT — LIFESTYLE VARIABLES
HOW OFTEN DO YOU HAVE A DRINK CONTAINING ALCOHOL: NEVER
HOW MANY STANDARD DRINKS CONTAINING ALCOHOL DO YOU HAVE ON A TYPICAL DAY: PATIENT DOES NOT DRINK

## 2023-04-05 NOTE — ED NOTES
Pt arrived to the ER as a walk in. He was brought back to section G by Derian. Pt was assigned to room 29. Pt was not pink slipped. Pt met the doctor at the section G locked door, spoke with the doctor, and then left out. [facility] police was contacted. I called [facility] and spoke to [facility], who advised that someone had just called and that [facility] is searching for him, due to the fact that pt threatened to kill his . I also added that pt expressed in triage the reason he wanted to kill his  was because he (the PO) is keeping him from his pregnant wife. I did alert my supervisor.        Mary Jane Mccracken, GERI  04/05/23 Chucky Romero, GERI  04/05/23 9741

## 2023-04-05 NOTE — ED PROVIDER NOTES
x3, well appearing, non toxic in NAD aggressive behavior  Head: Normocephalic and atraumatic  Eyes: PERRL, EOMI  Mouth: Oropharynx clear, handling secretions, no trismus  Neck: Supple, full ROM,   Pulmonary: Lungs clear to auscultation bilaterally, no wheezes, rales, or rhonchi. Not in respiratory distress  Cardiovascular:  Regular rate and rhythm, no murmurs, gallops, or rubs. 2+ distal pulses  Abdomen: Soft, non tender, non distended,   Extremities: Moves all extremities x 4. Warm and well perfused  Skin: warm and dry without rash  Neurologic: GCS 15, ambulates without difficulty  Psych: Angry not cooperative.      ------------------------------ ED COURSE/MEDICAL DECISION MAKING----------------------  Medications - No data to display          Medical Decision Makin-year-old male was brought in by police for threatening his . Unknown if police pink slipped the patient. He was placed in behavioral health holding area. When I bandaged into the room he came to the door and tried to open it. Would not allow me to interview him. He would not answer questions. Patient to force himself out the door and left AGAINST MEDICAL ADVICE. Unknown if patient was pink slipped by [facility] police. History From: patient    CC/HPI Summary, DDx, ED Course, Reassessment, Tests Considered, Patient expectation:   Frontal diagnosis includes but not limited to aggressive behavior, homicidal ideation, suicidal ideation, patient stated to staff that he is not suicidal however, aggressive behavior. Social Determinants affecting Dx or Tx: Patient has no PCP. Poor medical decision making. Chronic Conditions: Diabetes mellitus    Records Reviewed: Psychiatric intake 2023 was SI Was in physical restrains admit for  inpatient psychiatric evaluation. I am the Primary Clinician of Record. DISPOSITION: Left AMA Would not allow ED evaluation for pink slip    ED COURSE:       Counseling:    The

## 2023-04-05 NOTE — ED NOTES
Behavioral Health JENAE Supervisor was notified by [facility] AN AFFILIATE OF [facility] JENAE that patient eloped from the [facility] AN [facility]FFILIATE OF [facility]. Per chart review, patient reported in triage that he wanted to kill his . Patient is from [address] [place] [address]. SW Supervisor called Steve, Director at [address] [place] [address], notified her that patient eloped from the ER. She reported that they sent patient to the ER due to homicidal thoughts towards his , Asa Escamilla. SW Supervisor called Asa Escamilla ([phone]) to complete duty to warn, no answer- left voicemail. Steve from [facility] reported that she will also  Camrno Varela. [facility] JENAE notified [place] PD of elopment and duty to warn.     JOHNY Hurtado, [place] [address] Work Supervisor

## 2023-04-05 NOTE — ED NOTES
SW Supervisor received return call from [facility]. Duty to Warn completed. Officer Mike Her reported that [facility] PD is searching for patient and that there is a warrant out for his arrest.     If patient returns to the ED, [facility] PD will need notified.     JOHNY Ramires, Op[address] Work Supervisor

## 2023-04-05 NOTE — ED NOTES
Pt was brought in by [facility] RN to the [facility] AN AFFILIATE OF [facility]. He began arguing with Sotero Trevino as they entered through the door telling her \"I'm not pink slipped, I brought myself in here. \" Pt then gets on the [facility] pt phone trying to call someone (the call was not successful). This RN requested to [facility] RN to inform ER  that the pt is trying to leave and to inquire if he is to be pink slipped. Dr Kelly Rodríguez arrived at [facility] AN AFFILIATE [facility] door way simultaneously with pt. The pt became confrontational with Dr. Kelly Rodríguez at the doorway and tries to push his way out of the door. Pt tells Dr. Kelly Rodríguez that \"I don't want to talk to you, I am not pink slipped\". At this time this RN called [facility] PD officers for assistants d/t pt becoming more confrontational with Dr Kelly Rodríguez still in doorway. Dr Kelly Rodríguez, asked this RN why is pt here, and I informed him that I was not given any report but only handed a paper from [address] [place] [address].       Miguel Vasquez RN  04/05/23 Metsa 21, RN  04/05/23 Metsmauricio 21, RN  04/05/23 0315

## 2023-04-05 NOTE — Clinical Note
The patient has decided to leave against medical advice, because He was not Suicidal and did not want to be seen. He has normal mental status and adequate capacity to make medical decisions. The patient refuses hospital admission and wants to b e discharged. The risks have been explained to the patient, including worsening illness, chronic pain, permanent disability, and death. The benefits of admission have also been explained, including the availability and proximity of nurses, phys icians, monitoring, diagnostic testing, and treatment. The patient was able to understand and state the risks and benefits of hospital admission. This was witnessed by nurse in [facility]  and me. He had the opportunity to ask questions about his medi maribeth condition. The patient was treated to the extent that he would allow, and knows that he may return for care at any time.
